# Patient Record
Sex: MALE | Race: BLACK OR AFRICAN AMERICAN | Employment: FULL TIME | URBAN - METROPOLITAN AREA
[De-identification: names, ages, dates, MRNs, and addresses within clinical notes are randomized per-mention and may not be internally consistent; named-entity substitution may affect disease eponyms.]

---

## 2020-08-21 ENCOUNTER — HOSPITAL ENCOUNTER (OUTPATIENT)
Age: 30
Discharge: HOME | End: 2020-08-21
Payer: COMMERCIAL

## 2020-08-21 VITALS
SYSTOLIC BLOOD PRESSURE: 131 MMHG | HEIGHT: 67 IN | HEART RATE: 76 BPM | WEIGHT: 157 LBS | OXYGEN SATURATION: 97 % | RESPIRATION RATE: 16 BRPM | DIASTOLIC BLOOD PRESSURE: 93 MMHG | TEMPERATURE: 98 F | BODY MASS INDEX: 24.64 KG/M2

## 2020-08-21 DIAGNOSIS — Z20.822 CLOSE EXPOSURE TO COVID-19 VIRUS: Primary | ICD-10-CM

## 2020-08-21 DIAGNOSIS — Z20.822 ENCOUNTER FOR SCREENING LABORATORY TESTING FOR COVID-19 VIRUS IN ASYMPTOMATIC PATIENT: ICD-10-CM

## 2020-08-21 PROCEDURE — 99203 OFFICE O/P NEW LOW 30 MIN: CPT | Performed by: PHYSICIAN ASSISTANT

## 2020-08-21 NOTE — ED PROVIDER NOTES
Patient Seen in: [address] [facility] END      History   Patient presents with:  Testing    Stated Complaint: covid test,exposed    HPI    34-year-old male presents to the clinic for a COVID test.  Had exposure 5 days ago.   Denies any symptom Breath sounds: Normal breath sounds. Skin:     General: Skin is warm and dry. Capillary Refill: Capillary refill takes less than 2 seconds. Neurological:      Mental Status: He is alert.                ED Course     Labs Reviewed   2019 NOV[facility]

## 2020-08-24 LAB — SARS-COV-2 BY PCR: NOT DETECTED

## (undated) NOTE — LETTER
Date & Time: 8/21/2020, 9:01 AM  Patient: Benjy Singh  Encounter Provider(s):    FARHAD Chao       To Whom It May Concern:    Renata Winslow was seen and treated in our department on 8/21/2020.  He should remain quarantine until his covid test